# Patient Record
Sex: FEMALE | Race: WHITE | Employment: OTHER | URBAN - NONMETROPOLITAN AREA
[De-identification: names, ages, dates, MRNs, and addresses within clinical notes are randomized per-mention and may not be internally consistent; named-entity substitution may affect disease eponyms.]

---

## 2017-04-27 ENCOUNTER — OFFICE VISIT (OUTPATIENT)
Dept: INTERNAL MEDICINE | Age: 77
End: 2017-04-27

## 2017-04-27 VITALS
HEART RATE: 72 BPM | DIASTOLIC BLOOD PRESSURE: 86 MMHG | OXYGEN SATURATION: 96 % | HEIGHT: 63 IN | SYSTOLIC BLOOD PRESSURE: 130 MMHG | BODY MASS INDEX: 26.4 KG/M2 | WEIGHT: 149 LBS

## 2017-04-27 DIAGNOSIS — Z01.818 PREOP EXAMINATION: Primary | ICD-10-CM

## 2017-04-27 DIAGNOSIS — I87.2 VENOUS INSUFFICIENCY: ICD-10-CM

## 2017-04-27 DIAGNOSIS — Z86.718 HISTORY OF DVT (DEEP VEIN THROMBOSIS): ICD-10-CM

## 2017-04-27 DIAGNOSIS — E03.9 HYPOTHYROIDISM, UNSPECIFIED TYPE: ICD-10-CM

## 2017-04-27 PROCEDURE — 1090F PRES/ABSN URINE INCON ASSESS: CPT | Performed by: INTERNAL MEDICINE

## 2017-04-27 PROCEDURE — 1036F TOBACCO NON-USER: CPT | Performed by: INTERNAL MEDICINE

## 2017-04-27 PROCEDURE — 4040F PNEUMOC VAC/ADMIN/RCVD: CPT | Performed by: INTERNAL MEDICINE

## 2017-04-27 PROCEDURE — G8420 CALC BMI NORM PARAMETERS: HCPCS | Performed by: INTERNAL MEDICINE

## 2017-04-27 PROCEDURE — G8427 DOCREV CUR MEDS BY ELIG CLIN: HCPCS | Performed by: INTERNAL MEDICINE

## 2017-04-27 PROCEDURE — G8400 PT W/DXA NO RESULTS DOC: HCPCS | Performed by: INTERNAL MEDICINE

## 2017-04-27 PROCEDURE — 1123F ACP DISCUSS/DSCN MKR DOCD: CPT | Performed by: INTERNAL MEDICINE

## 2017-04-27 PROCEDURE — 99204 OFFICE O/P NEW MOD 45 MIN: CPT | Performed by: INTERNAL MEDICINE

## 2017-04-27 RX ORDER — GABAPENTIN 300 MG/1
300 CAPSULE ORAL NIGHTLY
COMMUNITY
Start: 2017-02-20 | End: 2022-06-30

## 2022-05-31 NOTE — PROGRESS NOTES
[facility]  Pulmonary medicine clinic initial consult note. Patient: Trip Cote  : 1940      Chief complaint/[place]: Trip Cote is a 80 y.o. old female came for further evaluation regarding her Asthma/COPD and shortness of breath with referral from Dr. Alpesh Hampton MD    She is having shortness of breath:Yes-  Especially with exercise  Onset: Gradual  Duration: Few years. She was diagnosed with  Bronchial asthma during her child alcocer  Diurnal variation:  None. Current functional capacity on level ground: Distance she can walk on level ground: 2 to  3blocks  She can climb steps: Yes  Flights of steps she can climb: 1  She gives a history of orthopnea:No  She gives a history of paroxysmal nocturnal dyspnea:No       She is having cough: Yes  Duration of cough: for few years  Her cough is associated with sputum production: No   Hemoptysis:No  Diurnal variation: None. She is having chest pain:No     She was diagnosed with bronchial asthma several years back. She was also diagnosed with a chronic obstructive pulmonary disease by her family physician. She is currently on treatment with following inhalers:  Anoro 1 puff daily  Albuterol HFA 2 puffs every 6 hourly as needed    She is currently using any oxygen supplementation at rest, exercise or during sleep/at night time: Yes    She was ever diagnosed with following pulmonary diseases:  Asthma: Yes. She was diagnosed with Bronchial asthma during her child alcocer.   Pulmonary fibrosis:NO  Sarcoidosis:NO  Pulmonary embolism: NO   History of DVT in the past: NO    Pulmonary hypertension:NO  Pleural effusion/s in the past:NO    She ever diagnosed with connective tissue diseases including Systemic lupus Erythematosus, Rheumatoid arthritis etc:NO    She ever diagnosed with pulmonary tuberculosis in the past:NO  She ever recently exposed to patients with tuberculosis:NO  She ever recently travelled to endemic places of tuberculosis or out side [place]:NO  Her ever tested for PPD in the past: NO    She ever diagnosed with COVID-19 infection in the past:No.    She denies any history of Glucoma or urinary retention in the past      Social History:  Occupation:  She is current working: No  Type of profession: retired. 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  She used to work for a contractor. Social History     Tobacco Use    Smoking status: Former Smoker     Packs/day: 1.00     Years: 10.00     Pack years: 10.00     Types: Cigarettes     Quit date: 1968     Years since quittin.6    Smokeless tobacco: Never Used   Substance Use Topics    Alcohol use: Yes     Alcohol/week: 2.0 standard drinks     Types: 2 Glasses of wine per week     Comment: occasional    Drug use: No     She had a history of tobacco smoking for only 10 years with 1 pack of cigarettes per day. She quit smoking in . [place] History of recreational or IV drug use in the past:NO  History of Alcohol use: No.       History of exposure to coal mines/coal dust: NO  History of exposure to foundry dust/welding: NO  History of exposure to quarry/silica/sandblasting: NO  History of exposure to asbestos/working with breaks/ships: NO  History of exposure to farm dust: NO  History of recent travel to long distances: NO  History of exposure to birds, pigeons, or chickens in the past:NO  Pet animals at home:No    History of pulmonary embolism in the past: No            History of DVT in the past:Yes - In her left lower extremity after having a child birth 42years back. Review of Systems:   General/Constitutional: No recent loss of weight or appetite changes. No fever or chills. HENT: Negative. Eyes: Negative. Upper respiratory tract: No nasal stuffiness or post nasal drip. Lower respiratory tract/ lungs: See HPI. . No hemoptysis.   Cardiovascular: No palpitations or chest pain. Gastrointestinal: No nausea or vomiting. Neurological: No focal neurologiacal weakness. Extremities: No edema. Musculoskeletal: No complaints. Genitourinary: No complaints. Hematological: Negative. Psychiatric/Behavioral: Negative. Skin: No itching.       Current Medications:          Past Medical History:   Diagnosis Date    Asthma     GERD (gastroesophageal reflux disease)     History of DVT (deep vein thrombosis) 1972    left leg - with childbirth    Hx of blood clots     Hypercholesterolemia     borderline    Hypothyroidism     Leg edema     PONV (postoperative nausea and vomiting)     nausea after hip surgery    Venous insufficiency        Past Surgical History:   Procedure Laterality Date    APPENDECTOMY  1978    CARPAL TUNNEL RELEASE Right 01/09/2017    CERVICAL FUSION  2016    FOOT SURGERY  2012    right    HERNIA REPAIR  1978    HYSTERECTOMY (CERVIX STATUS UNKNOWN)  1978    JOINT REPLACEMENT Left 11/20/2013    hip    LAPAROSCOPY N/A 07/26/2016    with BSO    TONSILLECTOMY  1947       Allergies   Allergen Reactions    Ciprofloxacin Hcl Nausea Only    Clindamycin/Lincomycin Other (See Comments)     Acid reflux- can take with pepcid AC     Sulfa Antibiotics      unknown       Current Outpatient Medications   Medication Sig Dispense Refill    famotidine (PEPCID) 40 MG tablet Take 40 mg by mouth daily      umeclidinium-vilanterol (ANORO ELLIPTA) 62.5-25 MCG/INH AEPB inhaler Inhale 1 puff into the lungs daily      aspirin 325 MG EC tablet Take 325 mg by mouth daily      metoprolol tartrate (LOPRESSOR) 25 MG tablet Take 25 mg by mouth daily 1/2 in pm      atorvastatin (LIPITOR) 20 MG tablet Take 20 mg by mouth daily      Albuterol Sulfate (PROAIR HFA IN) Inhale into the lungs      Calcium Citrate-Vitamin D (CALCIUM CITRATE+D3 PO) Take by mouth daily Calcium 630 mg and D3 500 mg      cetirizine (ZYRTEC) 10 MG tablet Take 10 mg by mouth daily      levothyroxine (SYNTHROID) 50 MCG tablet Take 50 mcg by mouth Daily.  furosemide (LASIX) 20 MG tablet Take  by mouth. 1 tablet daily prn swelling       No current facility-administered medications for this visit. Family History   Problem Relation Age of Onset    Arthritis Mother     Arthritis Father     Parkinsonism Father     Stroke Father     Mental Illness Sister         schizophrenia    Cancer Neg Hx     Diabetes Neg Hx     Heart Disease Neg Hx     High Blood Pressure Neg Hx            Physical Exam:    VITALS:  /76 (Site: Left Upper Arm, Position: Sitting, Cuff Size: Medium Adult)   Pulse 73   Temp 98.1 °F (36.7 °C)   Ht 5' 3\" (1.6 m)   Wt 136 lb 3.2 oz (61.8 kg)   SpO2 95% Comment: room air at rest  BMI 24.13 kg/m²   Nursing note and vitals reviewed. Constitutional: Patient appears moderately built and moderately nourished. No distress. Patient is oriented to person, place, and time. HENT:   Head: Normocephalic and atraumatic. Right Ear: External ear normal.   Left Ear: External ear normal.   Mouth/Throat: Oropharynx is clear and moist.  No oral thrush. Eyes: Conjunctivae are normal. Pupils are equal, round, and reactive to light. No scleral icterus. Neck: Neck supple. No JVD present. No tracheal deviation present. Cardiovascular: Normal rate, regular rhythm, normal heart sounds. No murmur heard. Pulmonary/Chest: Effort normal and breath sounds normal. No stridor. No respiratory distress. Occasional expiratory wheezes. No rales. Patient exhibits no tenderness. Abdominal: Soft. Patient exhibits no distension. No tenderness. Musculoskeletal: Normal range of motion. She was noted to have a scoliosis of the spine especially involving the lower part of the thoracic spine towards the left side. Extremities: Patient exhibits no edema and no tenderness. Lymphadenopathy:  No cervical adenopathy. Neurological: Patient is alert and oriented to person, place, and time. Skin: Skin is warm and dry. Patient is not diaphoretic. Psychiatric: Patient  has a normal mood and affect. Patient behavior is normal.       Diagnostic Data:    Radiological Data:  Chest x-ray PA and lateral views performed on 5 July 2016:  Tue Jul 5, 2016  1:26 PM   1. Distortion of the thorax, due to moderately severe scoliotic curvatures in the lumbar and lower thoracic spine. 2. Normal heart size. Mild chronic fibrotic scarring left lung base that project over the cardiac silhouette. 3. Severe midthoracic compression fracture which was present on the prior study but has worsened in the interim. 4. No acute findings. Pulmonary function tests: Performed on 16 March 2022                Echocardiogram: Performed on 2 March 2021. Right heart catheterization performed on 1 September 2021 by Dr. Cinda Phelan MD at [facility]:  Right atrial pressure: 10  Right ventricular pressure: 12  Pulmonary artery wedge pressure: 14  Mean pulmonary artery pressure: 24  Pulmonary vascular resistance:First reading- 201 dynes. seconds/centimeters 5/ second reading 200 dynes. seconds/centimeters 5      Left heart catheterization performed on 1 September 2021 by Dr. Cinda Phelan MD at [facility]:  There is a 30% stenosis of the left main  Cardiac index: 2.7 L/min  Cardiac output: 4.4 L/min  Hemoglobin 12.6 g/dL    Assessment:  -Moderately severe COPD. She is currently on treatment with Anoro and albuterol HFA-under control  -Mild persistent bronchial asthma diagnosed during childhood. Her symptoms are under good control.  -Persistent left basilar opacity with no significant change compared to her x-ray performed in October 2021-we will follow.  -Scoliosis with a deviation of the thoracic spine to the left side.  -History of old compression fractures of the thoracic spine  -Chronic diastolic congestive heart failure.   She is currently following with Dr. Cinda Phelan MD.  -? Mild pulmonary hypertension with pulmonary artery mean pressure of 24, pulmonary capillary wedge pressure of 14 with simultaneous pulmonary vascular resistance of < 3 Wood units noted on her right heart catheterization performed on 1 September 2021-patient did not meet the current definition of pulmonary hypertension. -GERD  -History of a DVT in the left lower extremity after childbirth  -Hypothyroidism on supplementation      Recommendations/Plan:  -Continue Anora 1puff daily in am. Frank Muñiz educated and demonstrated by me in my office how to use Anora. Patient verbalizes understanding. She was detailed about mechanism of action of drug along with associated side effects. She agreed to take the risk and medication. She verbalizes understanding.  -Continue Albuterol HFA 90mcg/Spray MDI, 2puffs  Q6Hprn. She was informed about adverse effects of Albuterol HFA. She verbalizes understanding.  -She was instructed to use Albuterol HFA  inhaler 2 puffs Q6h prn or Albuterol 2.5mg nebs Q6h prn (the nebulizer) at one time as rescue medication not both at the same time. She verbalizes understanding.   -Patient requires aerosol treatments at home to help maintain a stable respiratory status to help minimize emergency room visits and hospital admissions.  - Patient to have chest X-ray PA and Lateral views in 3 months to follow abnormal chest X-ray done on 6 May 2022. Chest Xray need to be done 2days before clinic visit. - She was educated and demonstrated in my office how to use inhalers. Frank Muñiz advised to continue prescribed inhalers and keep good compliance. - Patient educated to update her pneumococcal vaccine with family physician and take influenza vaccine in coming season with out fail.    --She was offered a Pulmonary rehab consult for pulmonary rehab therapy for her  COPD once cleared by her cardiologist. How ever at the end of discussion she refused and verbalizes understanding of consequences of her decision.  - Schedule patient for follow up with my clinic in 3months with a chest x-ray PA and lateral views to be done at least 2 days before clinic visit. She was advised to make early appointment if needed. Korin Flores and her  were educated about my impression and plan. They verbalizes understanding.       -I personally reviewed updated the Past medical hx, Past surgical hx,Social hx, Family hx, Medications, Allergies in the discrete data section of the patient chart along with labs, Pulmonary medicine,Sleep medicine related, Pathological, Microbiological and Radiological investigations.

## 2022-06-30 ENCOUNTER — INITIAL CONSULT (OUTPATIENT)
Dept: PULMONOLOGY | Age: 82
End: 2022-06-30
Payer: MEDICARE

## 2022-06-30 VITALS
HEIGHT: 63 IN | SYSTOLIC BLOOD PRESSURE: 120 MMHG | TEMPERATURE: 98.1 F | WEIGHT: 136.2 LBS | HEART RATE: 73 BPM | OXYGEN SATURATION: 95 % | DIASTOLIC BLOOD PRESSURE: 76 MMHG | BODY MASS INDEX: 24.13 KG/M2

## 2022-06-30 DIAGNOSIS — Z77.22 TOBACCO SMOKE EXPOSURE: ICD-10-CM

## 2022-06-30 DIAGNOSIS — R93.89 ABNORMAL CHEST X-RAY: ICD-10-CM

## 2022-06-30 DIAGNOSIS — J45.30 MILD PERSISTENT ASTHMA WITHOUT COMPLICATION: ICD-10-CM

## 2022-06-30 DIAGNOSIS — J44.9 MODERATE COPD (CHRONIC OBSTRUCTIVE PULMONARY DISEASE) (HCC): Primary | ICD-10-CM

## 2022-06-30 PROCEDURE — 1123F ACP DISCUSS/DSCN MKR DOCD: CPT | Performed by: INTERNAL MEDICINE

## 2022-06-30 PROCEDURE — G8400 PT W/DXA NO RESULTS DOC: HCPCS | Performed by: INTERNAL MEDICINE

## 2022-06-30 PROCEDURE — 1090F PRES/ABSN URINE INCON ASSESS: CPT | Performed by: INTERNAL MEDICINE

## 2022-06-30 PROCEDURE — 1036F TOBACCO NON-USER: CPT | Performed by: INTERNAL MEDICINE

## 2022-06-30 PROCEDURE — 99204 OFFICE O/P NEW MOD 45 MIN: CPT | Performed by: INTERNAL MEDICINE

## 2022-06-30 PROCEDURE — G8420 CALC BMI NORM PARAMETERS: HCPCS | Performed by: INTERNAL MEDICINE

## 2022-06-30 PROCEDURE — 3023F SPIROM DOC REV: CPT | Performed by: INTERNAL MEDICINE

## 2022-06-30 PROCEDURE — G8427 DOCREV CUR MEDS BY ELIG CLIN: HCPCS | Performed by: INTERNAL MEDICINE

## 2022-06-30 RX ORDER — UMECLIDINIUM BROMIDE AND VILANTEROL TRIFENATATE 62.5; 25 UG/1; UG/1
1 POWDER RESPIRATORY (INHALATION) DAILY
COMMUNITY

## 2022-06-30 RX ORDER — ATORVASTATIN CALCIUM 20 MG/1
20 TABLET, FILM COATED ORAL DAILY
COMMUNITY

## 2022-06-30 RX ORDER — FAMOTIDINE 40 MG/1
40 TABLET, FILM COATED ORAL DAILY
COMMUNITY

## 2022-06-30 RX ORDER — ALBUTEROL SULFATE 2.5 MG/3ML
2.5 SOLUTION RESPIRATORY (INHALATION) EVERY 6 HOURS PRN
Qty: 120 EACH | Refills: 8 | Status: SHIPPED | OUTPATIENT
Start: 2022-06-30 | End: 2023-06-30

## 2022-06-30 NOTE — PATIENT INSTRUCTIONS
Recommendations/Plan:  -Continue Anora 1puff daily in am. Trip Cote educated and demonstrated by me in my office how to use Anora. Patient verbalizes understanding. She was detailed about mechanism of action of drug along with associated side effects. She agreed to take the risk and medication. She verbalizes understanding.  -Continue Albuterol HFA 90mcg/Spray MDI, 2puffs  Q6Hprn. She was informed about adverse effects of Albuterol HFA. She verbalizes understanding.  -She was instructed to use Albuterol HFA  inhaler 2 puffs Q6h prn or Albuterol 2.5mg nebs Q6h prn (the nebulizer) at one time as rescue medication not both at the same time. She verbalizes understanding.   -Patient requires aerosol treatments at home to help maintain a stable respiratory status to help minimize emergency room visits and hospital admissions.  - Patient to have chest X-ray PA and Lateral views in 3 months to follow abnormal chest X-ray done on 6 May 2022. Chest Xray need to be done 2days before clinic visit. - She was educated and demonstrated in my office how to use inhalers. Trip Cote advised to continue prescribed inhalers and keep good compliance. - Patient educated to update her pneumococcal vaccine with family physician and take influenza vaccine in coming season with out fail. --She was offered a Pulmonary rehab consult for pulmonary rehab therapy for her  COPD once cleared by her cardiologist. How ever at the end of discussion she refused and verbalizes understanding of consequences of her decision.  - Schedule patient for follow up with my clinic in 3months with a chest x-ray PA and lateral views to be done at least 2 days before clinic visit. She was advised to make early appointment if needed. Trip Kera and her  were educated about my impression and plan. They verbalizes understanding.

## 2022-09-27 ENCOUNTER — TELEPHONE (OUTPATIENT)
Dept: PULMONOLOGY | Age: 82
End: 2022-09-27

## 2022-09-27 NOTE — TELEPHONE ENCOUNTER
Patient was scheduled for a 3  month f/u with cxr and patient cancelled. Patient stated she needs to cancel for now, she is taking care of her . I advised her to callback to get it rescheduled.